# Patient Record
Sex: MALE | Race: WHITE | URBAN - METROPOLITAN AREA
[De-identification: names, ages, dates, MRNs, and addresses within clinical notes are randomized per-mention and may not be internally consistent; named-entity substitution may affect disease eponyms.]

---

## 2018-06-14 ENCOUNTER — TESTING ONLY (OUTPATIENT)
Dept: URBAN - METROPOLITAN AREA CLINIC 18 | Facility: CLINIC | Age: 73
End: 2018-06-14
Payer: COMMERCIAL

## 2018-06-14 DIAGNOSIS — H52.223 REGULAR ASTIGMATISM, BILATERAL: Primary | ICD-10-CM

## 2018-06-14 PROCEDURE — 92012 INTRM OPH EXAM EST PATIENT: CPT | Performed by: OPTOMETRIST

## 2018-06-14 PROCEDURE — 92015 DETERMINE REFRACTIVE STATE: CPT | Performed by: OPTOMETRIST

## 2018-06-14 ASSESSMENT — VISUAL ACUITY
OS: 20/30
OD: 20/25

## 2018-06-14 ASSESSMENT — INTRAOCULAR PRESSURE
OS: 13
OD: 13

## 2018-06-14 ASSESSMENT — KERATOMETRY
OS: 42.63
OD: 41.75

## 2018-06-14 NOTE — IMPRESSION/PLAN
Impression: Regular astigmatism, bilateral: H52.223. Plan: Discussed diagnosis with patient. New glasses Rx given today.

## 2019-01-08 ENCOUNTER — OFFICE VISIT (OUTPATIENT)
Dept: URBAN - METROPOLITAN AREA CLINIC 18 | Facility: CLINIC | Age: 74
End: 2019-01-08
Payer: MEDICARE

## 2019-01-08 DIAGNOSIS — H43.812 VITREOUS DEGENERATION, LEFT EYE: ICD-10-CM

## 2019-01-08 PROCEDURE — 92014 COMPRE OPH EXAM EST PT 1/>: CPT | Performed by: OPTOMETRIST

## 2019-01-08 ASSESSMENT — VISUAL ACUITY: OS: 20/70

## 2019-01-08 ASSESSMENT — INTRAOCULAR PRESSURE
OD: 18
OS: 17

## 2019-01-08 NOTE — IMPRESSION/PLAN
Impression: Vitreous degeneration, left eye: H43.812. Plan: Discussed diagnosis and treatment. Will continue to monitor. Pt to call if VA worsens.

## 2019-01-08 NOTE — IMPRESSION/PLAN
Impression: s/p PPVX, ERMX, BRIDGER, Membrane blue OS 4/2/18 due to Puckering of macula, left eye: H35.372. OS. Condition: Plan: Discussed diagnosis in detail with patient. Will order mac oct to document the appearance of the macula and to rule out any other findings. Patient understands and will return n/a.

## 2019-01-08 NOTE — IMPRESSION/PLAN
Impression: Age-related nuclear cataract, bilateral: H25.13. Condition: established, stable OU. Plan: No treatment currently recommended. The patient will monitor vision changes and contact us with any decrease in vision.

## 2019-01-14 ENCOUNTER — TESTING ONLY (OUTPATIENT)
Dept: URBAN - METROPOLITAN AREA CLINIC 18 | Facility: CLINIC | Age: 74
End: 2019-01-14
Payer: MEDICARE

## 2019-01-14 DIAGNOSIS — H35.372 PUCKERING OF MACULA, LEFT EYE: Primary | ICD-10-CM

## 2019-01-14 PROCEDURE — 92134 CPTRZ OPH DX IMG PST SGM RTA: CPT | Performed by: OPTOMETRIST

## 2019-02-14 ENCOUNTER — OFFICE VISIT (OUTPATIENT)
Dept: URBAN - METROPOLITAN AREA CLINIC 18 | Facility: CLINIC | Age: 74
End: 2019-02-14
Payer: COMMERCIAL

## 2019-02-14 PROCEDURE — 92012 INTRM OPH EXAM EST PATIENT: CPT | Performed by: OPTOMETRIST

## 2019-02-14 PROCEDURE — 92015 DETERMINE REFRACTIVE STATE: CPT | Performed by: OPTOMETRIST

## 2019-02-14 ASSESSMENT — VISUAL ACUITY
OD: 20/20
OS: 20/40

## 2019-02-14 ASSESSMENT — KERATOMETRY
OS: 42.25
OD: 42.25

## 2022-01-31 ENCOUNTER — OFFICE VISIT (OUTPATIENT)
Dept: URBAN - METROPOLITAN AREA CLINIC 18 | Facility: CLINIC | Age: 77
End: 2022-01-31
Payer: OTHER MISCELLANEOUS

## 2022-01-31 DIAGNOSIS — H35.3131 NONEXUDATIVE AGE-RELATED MACULAR DEGENERATION, BILATERAL, EARLY DRY STAGE: Primary | ICD-10-CM

## 2022-01-31 PROCEDURE — 99214 OFFICE O/P EST MOD 30 MIN: CPT | Performed by: OPTOMETRIST

## 2022-01-31 ASSESSMENT — INTRAOCULAR PRESSURE
OS: 15
OD: 15

## 2022-01-31 NOTE — IMPRESSION/PLAN
Impression: Nonexudative age-related macular degeneration, bilateral, early dry stage: H35.3131. Plan: Macular degeneration, dry type with stable vision. Will continue to monitor vision and the patient has been instructed to call with any vision changes. Recommend PreserVision AREDS 2 supplement and UV protection.

## 2022-01-31 NOTE — IMPRESSION/PLAN
Impression: Puckering of macula, left eye: H35.372 OS. Plan: Never did get a requested follow up with Dr Erendira Madison.  Schedule macula consult with Kain ANNE/MERA.

## 2022-03-22 ENCOUNTER — OFFICE VISIT (OUTPATIENT)
Dept: URBAN - METROPOLITAN AREA CLINIC 17 | Facility: CLINIC | Age: 77
End: 2022-03-22
Payer: OTHER MISCELLANEOUS

## 2022-03-22 DIAGNOSIS — H25.13 AGE-RELATED NUCLEAR CATARACT, BILATERAL: ICD-10-CM

## 2022-03-22 PROCEDURE — 99204 OFFICE O/P NEW MOD 45 MIN: CPT | Performed by: OPHTHALMOLOGY

## 2022-03-22 PROCEDURE — 92134 CPTRZ OPH DX IMG PST SGM RTA: CPT | Performed by: OPHTHALMOLOGY

## 2022-03-22 ASSESSMENT — INTRAOCULAR PRESSURE
OD: 17
OS: 17

## 2022-03-22 NOTE — IMPRESSION/PLAN
Impression: Age-related nuclear cataract, bilateral: H25.13. Bilateral. OS >> OD. Vision: vision affected OS. Plan: Discussed diagnosis in detail with patient. Recommend a Cataract evaluation first for consideration of surgery OS. Will reassess the retina post Cataract sx.

## 2022-03-22 NOTE — IMPRESSION/PLAN
Impression: Puckering of macula, left eye: H35.372. Left. Condition: new prob, no addtl w/u needed. s/p PPVX for Removal of ERM OS 4/2/2018 w/Dr Whitaker Plan: Discussed diagnosis in detail with patient. No treatment is required at this time. Recommend a Cataract evaluation for consideration of surgery. Will reassess the retina post Cataract sx. OCT OS shows increase in CMT w/ERM and Optos OS shows a hazy view due to Cataract.

## 2022-05-09 ENCOUNTER — OFFICE VISIT (OUTPATIENT)
Dept: URBAN - METROPOLITAN AREA CLINIC 17 | Facility: CLINIC | Age: 77
End: 2022-05-09
Payer: OTHER MISCELLANEOUS

## 2022-05-09 DIAGNOSIS — H25.13 AGE-RELATED NUCLEAR CATARACT, BILATERAL: Primary | ICD-10-CM

## 2022-05-09 PROCEDURE — 99214 OFFICE O/P EST MOD 30 MIN: CPT | Performed by: OPHTHALMOLOGY

## 2022-05-09 RX ORDER — KETOROLAC TROMETHAMINE 5 MG/ML
0.5 % SOLUTION OPHTHALMIC
Qty: 5 | Refills: 1 | Status: ACTIVE
Start: 2022-05-09

## 2022-05-09 ASSESSMENT — INTRAOCULAR PRESSURE
OD: 17
OS: 18

## 2022-05-09 ASSESSMENT — VISUAL ACUITY
OD: 20/25
OS: 20/150

## 2022-05-09 ASSESSMENT — KERATOMETRY
OS: 42.13
OD: 41.88

## 2022-05-09 NOTE — IMPRESSION/PLAN
Impression: Age-related nuclear cataract, bilateral: H25.13. Condition: established, worsening. Symptoms: could improve with surgery. Plan: Cataract accounts for patient's complaints. Reviewed risks, benefits, and procedure. Patient desires surgery, schedule ce/iol OS, RL2, standard lens, distance refractive target, patient is clear for surgery in [place] [id number]. Recommend Dexycu to avoid noncompliance with drops and to help maintain infection/inflammation. Pt to use NSAID for 6-8 weeks after surgery. 
no surgery needed OD

## 2022-06-09 ENCOUNTER — PRE-OPERATIVE VISIT (OUTPATIENT)
Dept: URBAN - METROPOLITAN AREA CLINIC 17 | Facility: CLINIC | Age: 77
End: 2022-06-09
Payer: OTHER MISCELLANEOUS

## 2022-06-09 DIAGNOSIS — H25.13 AGE-RELATED NUCLEAR CATARACT, BILATERAL: Primary | ICD-10-CM

## 2022-06-09 ASSESSMENT — PACHYMETRY
OD: 2.56
OS: 23.86
OS: 2.83
OD: 23.99

## 2022-06-21 ENCOUNTER — SURGERY (OUTPATIENT)
Dept: URBAN - METROPOLITAN AREA SURGERY 7 | Facility: SURGERY | Age: 77
End: 2022-06-21
Payer: OTHER MISCELLANEOUS

## 2022-06-21 DIAGNOSIS — H25.13 AGE-RELATED NUCLEAR CATARACT, BILATERAL: Primary | ICD-10-CM

## 2022-06-21 PROCEDURE — 66984 XCAPSL CTRC RMVL W/O ECP: CPT | Performed by: OPHTHALMOLOGY

## 2022-06-22 ENCOUNTER — POST-OPERATIVE VISIT (OUTPATIENT)
Dept: URBAN - METROPOLITAN AREA CLINIC 17 | Facility: CLINIC | Age: 77
End: 2022-06-22
Payer: OTHER MISCELLANEOUS

## 2022-06-22 DIAGNOSIS — Z48.810 ENCOUNTER FOR SURGICAL AFTERCARE FOLLOWING SURGERY ON A SENSE ORGAN: Primary | ICD-10-CM

## 2022-06-22 PROCEDURE — 99024 POSTOP FOLLOW-UP VISIT: CPT | Performed by: OPTOMETRIST

## 2022-06-22 ASSESSMENT — INTRAOCULAR PRESSURE
OD: 11
OS: 17

## 2022-06-22 NOTE — IMPRESSION/PLAN
Impression: S/P Cataract Extraction by phacoemulsification with IOL placement [id number] OS - 1 Day. Encounter for surgical aftercare following surgery on a sense organ  Z48.810. Plan: Return in 1 week. --Advised patient to use artificial tears for comfort. --Continue Ketorolac 0.5% QID OS x 6-8 weeks

## 2022-06-28 ENCOUNTER — POST-OPERATIVE VISIT (OUTPATIENT)
Dept: URBAN - METROPOLITAN AREA CLINIC 17 | Facility: CLINIC | Age: 77
End: 2022-06-28
Payer: OTHER MISCELLANEOUS

## 2022-06-28 DIAGNOSIS — Z48.810 ENCOUNTER FOR SURGICAL AFTERCARE FOLLOWING SURGERY ON A SENSE ORGAN: Primary | ICD-10-CM

## 2022-06-28 PROCEDURE — 99024 POSTOP FOLLOW-UP VISIT: CPT | Performed by: OPTOMETRIST

## 2022-06-28 ASSESSMENT — INTRAOCULAR PRESSURE
OD: 13
OS: 15

## 2022-06-28 NOTE — IMPRESSION/PLAN
Impression: S/P Cataract Extraction by phacoemulsification with IOL placement [id number] OS - 7 Days. Encounter for surgical aftercare following surgery on a sense organ  Z48.810. Plan: Return in 3 weeks. --Advised patient to use artificial tears for comfort. --Continue Ketorolac 0.5% QID OS

## 2022-07-27 ENCOUNTER — POST-OPERATIVE VISIT (OUTPATIENT)
Dept: URBAN - METROPOLITAN AREA CLINIC 17 | Facility: CLINIC | Age: 77
End: 2022-07-27
Payer: OTHER MISCELLANEOUS

## 2022-07-27 DIAGNOSIS — Z48.810 ENCOUNTER FOR SURGICAL AFTERCARE FOLLOWING SURGERY ON A SENSE ORGAN: Primary | ICD-10-CM

## 2022-07-27 PROCEDURE — 99024 POSTOP FOLLOW-UP VISIT: CPT | Performed by: OPTOMETRIST

## 2022-07-27 ASSESSMENT — INTRAOCULAR PRESSURE
OD: 16
OS: 18

## 2022-07-27 NOTE — IMPRESSION/PLAN
Impression: S/P Cataract Extraction by phacoemulsification with IOL placement [id number] OS - 36 Days. Encounter for surgical aftercare following surgery on a sense organ  Z48.810. Excellent post op course   Post operative instructions reviewed - Condition is improving - Plan: 3 MO DE. PRN for refraction --Continue Ketorolac 0.5% QID OS x1 week then d/c--Advised patient to use artificial tears for comfort.  Gave 2 samples of Systane Complete PFTs

## 2022-09-19 ENCOUNTER — OFFICE VISIT (OUTPATIENT)
Dept: URBAN - METROPOLITAN AREA CLINIC 17 | Facility: CLINIC | Age: 77
End: 2022-09-19
Payer: COMMERCIAL

## 2022-09-19 DIAGNOSIS — H52.223 REGULAR ASTIGMATISM, BILATERAL: Primary | ICD-10-CM

## 2022-09-19 PROCEDURE — 92012 INTRM OPH EXAM EST PATIENT: CPT | Performed by: OPTOMETRIST

## 2022-09-19 ASSESSMENT — INTRAOCULAR PRESSURE
OD: 18
OS: 17

## 2022-09-19 ASSESSMENT — VISUAL ACUITY
OS: 20/25
OD: 20/25

## 2022-10-26 ENCOUNTER — OFFICE VISIT (OUTPATIENT)
Dept: URBAN - METROPOLITAN AREA CLINIC 17 | Facility: CLINIC | Age: 77
End: 2022-10-26
Payer: OTHER MISCELLANEOUS

## 2022-10-26 DIAGNOSIS — Z96.1 PRESENCE OF PSEUDOPHAKIA: ICD-10-CM

## 2022-10-26 DIAGNOSIS — H43.813 VITREOUS DEGENERATION, BILATERAL: ICD-10-CM

## 2022-10-26 DIAGNOSIS — H25.11 AGE-RELATED NUCLEAR CATARACT, RIGHT EYE: ICD-10-CM

## 2022-10-26 DIAGNOSIS — H35.372 PUCKERING OF MACULA, LEFT EYE: Primary | ICD-10-CM

## 2022-10-26 PROCEDURE — 99214 OFFICE O/P EST MOD 30 MIN: CPT | Performed by: OPTOMETRIST

## 2022-10-26 ASSESSMENT — INTRAOCULAR PRESSURE
OS: 18
OD: 19

## 2022-10-26 NOTE — IMPRESSION/PLAN
Impression: Puckering of macula, left eye: H35.372. Plan: Discussed diagnosis in detail with patient. No treatment is required at this time. Will continue to observe condition and or symptoms. Reassured patient of current condition and treatment.  Per Dr. Gomez Backbone notes he wanted to see pt after cat sx

## 2022-10-26 NOTE — IMPRESSION/PLAN
Impression: Presence of pseudophakia: Z96.1 Left. Plan: Patient advised to continue to use drops as instructed and to not rub eye. Patient was also advised to keep appointment in one week for follow up. Patient to call office with any problems.   Per Dr. Tete Reyes notes he wanted to see pt after cat sx

## 2022-12-05 ENCOUNTER — OFFICE VISIT (OUTPATIENT)
Dept: URBAN - METROPOLITAN AREA CLINIC 17 | Facility: CLINIC | Age: 77
End: 2022-12-05
Payer: OTHER MISCELLANEOUS

## 2022-12-05 DIAGNOSIS — H35.372 PUCKERING OF MACULA, LEFT EYE: Primary | ICD-10-CM

## 2022-12-05 PROCEDURE — 92134 CPTRZ OPH DX IMG PST SGM RTA: CPT | Performed by: OPHTHALMOLOGY

## 2022-12-05 PROCEDURE — 99213 OFFICE O/P EST LOW 20 MIN: CPT | Performed by: OPHTHALMOLOGY

## 2022-12-05 ASSESSMENT — INTRAOCULAR PRESSURE
OS: 15
OD: 13

## 2022-12-05 NOTE — IMPRESSION/PLAN
Impression: s/p PPV for Puckering of macula, left eye: H35.372. Left. Condition: stable post surgery s/p PPVX for Removal of ERM OS 4/2/2018 w/Dr Whitaker Plan: Discussed diagnosis in detail with patient. Exam and Optos shows minimal increase in edema and ILM changes. OCT shows minimal increase in edema and minimal ILM changes. No treatment is required at this time based on exam and diagnostic tests. Recommend observation for now. Pt to see Dr Ray Chambers in 6 months, refer back to retina if needed.